# Patient Record
Sex: FEMALE | Race: WHITE | URBAN - METROPOLITAN AREA
[De-identification: names, ages, dates, MRNs, and addresses within clinical notes are randomized per-mention and may not be internally consistent; named-entity substitution may affect disease eponyms.]

---

## 2019-04-05 ENCOUNTER — APPOINTMENT (RX ONLY)
Dept: URBAN - METROPOLITAN AREA CLINIC 323 | Facility: CLINIC | Age: 64
Setting detail: DERMATOLOGY
End: 2019-04-05

## 2019-04-05 DIAGNOSIS — Z41.9 ENCOUNTER FOR PROCEDURE FOR PURPOSES OTHER THAN REMEDYING HEALTH STATE, UNSPECIFIED: ICD-10-CM

## 2019-04-05 DIAGNOSIS — L98.8 OTHER SPECIFIED DISORDERS OF THE SKIN AND SUBCUTANEOUS TISSUE: ICD-10-CM

## 2019-04-05 PROBLEM — Z85.828 PERSONAL HISTORY OF OTHER MALIGNANT NEOPLASM OF SKIN: Status: ACTIVE | Noted: 2019-04-05

## 2019-04-05 PROBLEM — G70.9 MYONEURAL DISORDER, UNSPECIFIED: Status: ACTIVE | Noted: 2019-04-05

## 2019-04-05 PROBLEM — D89.9 DISORDER INVOLVING THE IMMUNE MECHANISM, UNSPECIFIED: Status: ACTIVE | Noted: 2019-04-05

## 2019-04-05 PROBLEM — E03.9 HYPOTHYROIDISM, UNSPECIFIED: Status: ACTIVE | Noted: 2019-04-05

## 2019-04-05 PROCEDURE — ? PATIENT SPECIFIC COUNSELING

## 2019-04-05 PROCEDURE — ? OTHER (COSMETIC)

## 2019-04-05 ASSESSMENT — LOCATION SIMPLE DESCRIPTION DERM: LOCATION SIMPLE: LEFT LIP

## 2019-04-05 ASSESSMENT — LOCATION ZONE DERM: LOCATION ZONE: LIP

## 2019-04-05 ASSESSMENT — LOCATION DETAILED DESCRIPTION DERM: LOCATION DETAILED: LEFT INFERIOR VERMILION LIP

## 2019-04-05 NOTE — PROCEDURE: PATIENT SPECIFIC COUNSELING
Detail Level: Zone
Treatment options included: VBeam, Varilite, Gentle Max.
Detail Level: Simple
Treatment options included: Erbium laser, CO2 laser, shave excsion, elliptical excision. Risks including scar, pigment change, recurrence, recurrence with black pigment requiring excision , infection discussed.

## 2019-05-30 ENCOUNTER — APPOINTMENT (RX ONLY)
Dept: URBAN - METROPOLITAN AREA CLINIC 322 | Facility: CLINIC | Age: 64
Setting detail: DERMATOLOGY
End: 2019-05-30

## 2019-05-30 DIAGNOSIS — L82.1 OTHER SEBORRHEIC KERATOSIS: ICD-10-CM

## 2019-05-30 DIAGNOSIS — L98.8 OTHER SPECIFIED DISORDERS OF THE SKIN AND SUBCUTANEOUS TISSUE: ICD-10-CM

## 2019-05-30 DIAGNOSIS — Z41.9 ENCOUNTER FOR PROCEDURE FOR PURPOSES OTHER THAN REMEDYING HEALTH STATE, UNSPECIFIED: ICD-10-CM

## 2019-05-30 PROCEDURE — ? LIQUID NITROGEN (COSMETIC)

## 2019-05-30 PROCEDURE — ? VARILITE

## 2019-05-30 PROCEDURE — ? PATIENT SPECIFIC COUNSELING

## 2019-05-30 PROCEDURE — ? ERBIUM: YAG NON-ABLATIVE LASER

## 2019-05-30 ASSESSMENT — LOCATION ZONE DERM
LOCATION ZONE: FACE
LOCATION ZONE: LIP

## 2019-05-30 ASSESSMENT — LOCATION SIMPLE DESCRIPTION DERM
LOCATION SIMPLE: RIGHT FOREHEAD
LOCATION SIMPLE: RIGHT LIP
LOCATION SIMPLE: LEFT CHEEK

## 2019-05-30 ASSESSMENT — LOCATION DETAILED DESCRIPTION DERM
LOCATION DETAILED: LEFT MEDIAL BUCCAL CHEEK
LOCATION DETAILED: RIGHT MEDIAL FOREHEAD
LOCATION DETAILED: RIGHT INFERIOR VERMILION LIP

## 2019-05-30 NOTE — PROCEDURE: ERBIUM: YAG NON-ABLATIVE LASER
Procedural Text: The treatment areas where then ablated.
Energy In Mj/Cm2: 0.3
Spot Size: 1mm
Pre-Procedure Text: After consent was obtained and the procedure was explained, all persons present in the exam room put on their protective eyewear. \\nLaser was calibrated. Settings confirmed and stated out loud.
Price (Use Numbers Only, No Special Characters Or $): 150.0
Consent: Written consent obtained, risks reviewed including but not limited to crusting, scabbing, blistering, scarring, darker or lighter pigmentary change, and/or incomplete removal.
Post-Care Instructions: I reviewed with the patient in detail post-care instructions. Patient is to apply vaseline with a q-tip to all crusted areas, and avoid picking at any scabs. Pt should stay away from the sun and wear sun protection until fully healed.
Detail Level: Zone
External Cooling Fan Speed: 5

## 2019-05-30 NOTE — PROCEDURE: LIQUID NITROGEN (COSMETIC)
Price (Use Numbers Only, No Special Characters Or $): 0.0
Render Post-Care Instructions In Note?: no
Post-Care Instructions: I reviewed with the patient in detail post-care instructions. Patient is to wear sunprotection, and avoid picking at any of the treated lesions. Pt may apply Vaseline to crusted or scabbing areas.
Consent: The patient's consent was obtained including but not limited to risks of crusting, scabbing, blistering, scarring, darker or lighter pigmentary change, recurrence, incomplete removal and infection. The patient understands that the procedure is cosmetic in nature and is not covered by insurance.
Detail Level: Simple

## 2019-05-30 NOTE — PROCEDURE: VARILITE
Treatment Number: 1
Pulse Duration: 5
Wavelength: 940 nm
Energy Density: 340 mJ/cm2
Consent: Written consent obtained, risks reviewed including but not limited to crusting, scabbing, blistering, scarring, darker or lighter pigmentary change, systemic reactions, ulceration, incidental hair removal, bruising, and/or incomplete removal.
Spot Size In Mm: 1.0 mm
External Cooling Fan Speed: 0
Detail Level: Simple
Endpoint erythema. Vaseline and ice applied. Post care reviewed with patient.
Post-Care Instructions: I reviewed with the patient in detail post-care instructions. Patient should avoid sunlight and wear sun protection.
Energy Density: 950 J/cm2
Wavelength: 532 nm
Price (Use Numbers Only, No Special Characters Or $): 95.0